# Patient Record
Sex: FEMALE | Race: WHITE | NOT HISPANIC OR LATINO
[De-identification: names, ages, dates, MRNs, and addresses within clinical notes are randomized per-mention and may not be internally consistent; named-entity substitution may affect disease eponyms.]

---

## 2017-02-28 ENCOUNTER — HOSPITAL (OUTPATIENT)
Dept: OTHER | Age: 62
End: 2017-02-28
Attending: FAMILY MEDICINE

## 2017-12-23 ENCOUNTER — HOSPITAL ENCOUNTER (OUTPATIENT)
Age: 62
Discharge: HOME | End: 2017-12-23
Attending: FAMILY MEDICINE
Payer: COMMERCIAL

## 2017-12-23 VITALS
HEART RATE: 72 BPM | BODY MASS INDEX: 24.84 KG/M2 | TEMPERATURE: 98 F | HEIGHT: 62 IN | OXYGEN SATURATION: 100 % | WEIGHT: 135 LBS | SYSTOLIC BLOOD PRESSURE: 155 MMHG | RESPIRATION RATE: 18 BRPM | DIASTOLIC BLOOD PRESSURE: 86 MMHG

## 2017-12-23 DIAGNOSIS — H61.23 BILATERAL IMPACTED CERUMEN: Primary | ICD-10-CM

## 2017-12-23 PROCEDURE — 99202 OFFICE O/P NEW SF 15 MIN: CPT

## 2017-12-23 RX ORDER — SIMVASTATIN 10 MG
10 TABLET ORAL NIGHTLY
COMMUNITY

## 2017-12-23 RX ORDER — MONTELUKAST SODIUM 10 MG/1
10 TABLET ORAL NIGHTLY
COMMUNITY

## 2017-12-23 NOTE — ED PROVIDER NOTES
Patient Seen in: [address]    History   No chief complaint on file. Stated Complaint: ear problem     HPI    Pt is a 56 yo with cerumen impacted B. Feels pressure in both ears. History reviewed.  No pertinent past Prescribed:  Current Discharge Medication List

## 2021-02-03 ENCOUNTER — IMMUNIZATION (OUTPATIENT)
Dept: LAB | Age: 66
End: 2021-02-03

## 2021-02-03 DIAGNOSIS — Z23 NEED FOR VACCINATION: Primary | ICD-10-CM

## 2021-02-03 PROCEDURE — 0001A COVID 19 PFIZER-BIONTECH: CPT

## 2021-02-03 PROCEDURE — 91300 COVID 19 PFIZER-BIONTECH: CPT

## 2021-02-24 ENCOUNTER — IMMUNIZATION (OUTPATIENT)
Dept: LAB | Age: 66
End: 2021-02-24

## 2021-02-24 DIAGNOSIS — Z23 NEED FOR VACCINATION: Primary | ICD-10-CM

## 2021-02-24 PROCEDURE — 91300 COVID 19 PFIZER-BIONTECH: CPT

## 2021-02-24 PROCEDURE — 0002A COVID 19 PFIZER-BIONTECH: CPT

## 2022-09-09 ENCOUNTER — LAB ENCOUNTER (OUTPATIENT)
Dept: LAB | Facility: HOSPITAL | Age: 67
End: 2022-09-09
Attending: DERMATOLOGY
Payer: COMMERCIAL

## 2022-09-09 DIAGNOSIS — Z13.9 SCREENING PROCEDURE: ICD-10-CM

## 2022-09-09 LAB — SARS-COV-2 RNA RESP QL NAA+PROBE: NOT DETECTED

## 2024-01-06 ENCOUNTER — APPOINTMENT (OUTPATIENT)
Dept: GENERAL RADIOLOGY | Age: 69
End: 2024-01-06
Attending: NURSE PRACTITIONER
Payer: MEDICARE

## 2024-01-06 ENCOUNTER — HOSPITAL ENCOUNTER (OUTPATIENT)
Age: 69
Discharge: HOME | End: 2024-01-06
Payer: MEDICARE

## 2024-01-06 VITALS
OXYGEN SATURATION: 98 % | TEMPERATURE: 100 F | HEART RATE: 87 BPM | RESPIRATION RATE: 18 BRPM | DIASTOLIC BLOOD PRESSURE: 81 MMHG | SYSTOLIC BLOOD PRESSURE: 151 MMHG

## 2024-01-06 DIAGNOSIS — S92.902A CLOSED FRACTURE OF LEFT FOOT, INITIAL ENCOUNTER: ICD-10-CM

## 2024-01-06 DIAGNOSIS — S62.515A CLOSED NONDISPLACED FRACTURE OF PROXIMAL PHALANX OF LEFT THUMB, INITIAL ENCOUNTER: Primary | ICD-10-CM

## 2024-01-06 DIAGNOSIS — S92.901A CLOSED FRACTURE OF RIGHT FOOT, INITIAL ENCOUNTER: ICD-10-CM

## 2024-01-06 PROCEDURE — 73630 X-RAY EXAM OF FOOT: CPT | Performed by: NURSE PRACTITIONER

## 2024-01-06 PROCEDURE — A6448 LT COMPRES BAND <3"/YD: HCPCS | Performed by: NURSE PRACTITIONER

## 2024-01-06 PROCEDURE — 99203 OFFICE O/P NEW LOW 30 MIN: CPT | Performed by: NURSE PRACTITIONER

## 2024-01-06 PROCEDURE — L3260 AMBULATORY SURGICAL BOOT EAC: HCPCS | Performed by: NURSE PRACTITIONER

## 2024-01-06 PROCEDURE — A4570 SPLINT: HCPCS | Performed by: NURSE PRACTITIONER

## 2024-01-06 PROCEDURE — 73140 X-RAY EXAM OF FINGER(S): CPT | Performed by: NURSE PRACTITIONER

## 2024-01-06 NOTE — ED PROVIDER NOTES
Patient Seen in: [facility]    History   CC: fall  HPI: Avis FISHER Felipe 68 year old female  who presents c/o bilateral foot pain and left thumb pain status post injury in which patient states she got her heel stuck on a stair yesterday and accidentally fell.  Denies hitting her head or loss of consciousness.  Denies headache, dizziness, neck pain, back pain or radicular signs/symptoms/numbness/tingling/weakness associated.     No past medical history on file.    No past surgical history on file.    No family history on file.    Social History     Socioeconomic History    Marital status:        ROS:  Review of Systems    Positive for stated complaint: foot injury  Other systems are as noted in HPI.  Constitutional and vital signs reviewed.      All other systems reviewed and negative except as noted above.    PSFH elements reviewed from today and agreed except as otherwise stated in HPI.             Constitutional and vital signs reviewed.        Physical Exam     ED Triage Vitals [01/06/24 1414]   /81   Pulse 87   Resp 18   Temp 99.7 °F (37.6 °C)   Temp src Temporal   SpO2 98 %   O2 Device None (Room air)       Current:/81   Pulse 87   Temp 99.7 °F (37.6 °C) (Temporal)   Resp 18   SpO2 98%         PE:  General - Appears well, non-toxic and in NAD  Head - Appears symmetrical without deformity/swelling cranium, scalp, or facial bones  Skin -there is diffuse swelling and ecchymosis noted to the dorsal feet bilaterally.  No open wounds.  Skin is otherwise pink warm and dry throughout, mmm, cap refill <2seconds  Neuro - A&O x4, sensation equal to both medial and lateral aspects of lower extremities, steady gait  MSK - makes purposeful movements of lower extremities, pedal pulses 2+ bilat.  no midline spinal tenderness or obvious sign of trauma/swelling/deformity, +flexion of the 1st and 5th toes bilat.  + Tenderness is elicited with palpation to the proximal foot and anterior ankle  bilaterally.  There is no lateral/medial ankle tenderness bilaterally, calcaneus, shin, calf, knee or thigh tenderness to the lower extremities bilaterally.  No toe tenderness.  Proximal left thumb tenderness on exam.  No wrist, hand including navicular tenderness on exam.  Psych - Interactive and appropriate      ED Course   Labs Reviewed - No data to display    MDM     XR FINGER(S) (MIN 2 VIEWS), LEFT THUMB (CPT=73140) (Final result)  Result time 01/06/24 14:55:40  Final result by Scott Colón MD (01/06/24 14:55:40)                Impression:    CONCLUSION:     Nondisplaced intra-articular fracture base of the proximal 1st phalanx.           Dictated by (CST): Scott Colón MD on 1/06/2024 at 2:54 PM      Finalized by (CST): Scott Colón MD on 1/06/2024 at 2:55 PM                  Narrative:    PROCEDURE: XR FINGER(S) (MIN 2 VIEWS), LEFT THUMB (CPT=73140)     COMPARISON: None.     INDICATIONS: Left thumb pain following a fall 1 day prior.     TECHNIQUE: 3 views were obtained.       FINDINGS:  BONES: There appears to be a nondisplaced intra-articular fracture involving the base of the proximal 1st phalanx.  Mild-to-moderate degenerative change noted at the 1st CMC joint with mild degenerative change in the 1st finger.  SOFT TISSUES: Negative. No visible soft tissue swelling.  EFFUSION: None visible.  OTHER: Negative.                                  XR FOOT, COMPLETE (MIN 3 VIEWS), LEFT (CPT=73630) (Edited Result - FINAL)  Result time 01/06/24 14:58:55  Addendum (preliminary) 1 of 1 by Scott Colón MD (01/06/24 14:58:55)    This report includes an Addendum and supersedes previous reports for this exam.           PROCEDURE:  XR FOOT, COMPLETE (MIN 3 VIEWS), LEFT (CPT=73630)     COMPARISON: None.     INDICATIONS:  Pain/contusion over the left tarsals/metatarsal regions following a fall 1 day prior.     TECHNIQUE:    3 views were obtained.       FINDINGS:          BONES:             Small osseous  density is seen along the distal dorsal mallet gin of the talus on the lateral view.  Mild 1st MTP degenerative change.  Mild calcaneal enthesophytes  SOFT TISSUES:            Negative. No visible soft tissue swelling.   EFFUSION:       None visible.   OTHER:             Negative.      CONCLUSION:     Small osseous density along the distal dorsal margin of the talus which could reflect an age-indeterminate avulsion fracture.                     Dictated by (CST): Scott Colón MD on 1/06/2024 at 2:55 PM       Finalized by (CST): Scott Colón MD on 1/06/2024 at 2:56 PM           ADDENDUM:  Please see the addended report which was inadvertently finalized prior to completion.              Dictated by (CST): Scott Colón MD on 1/06/2024 at 2:58 PM       Finalized by (CST): Scott Colón MD on 1/06/2024 at 2:58 PM                             Final result by Scott Colón MD (01/06/24 14:56:43)                Impression:    CONCLUSION:     No acute fracture or dislocation.           Dictated by (CST): Scott Colón MD on 1/06/2024 at 2:55 PM      Finalized by (CST): Scott Colón MD on 1/06/2024 at 2:56 PM                  Narrative:    PROCEDURE: XR FOOT, COMPLETE (MIN 3 VIEWS), LEFT (CPT=73630)     COMPARISON: None.     INDICATIONS: Pain/contusion over the left tarsals/metatarsal regions following a fall 1 day prior.     TECHNIQUE: 3 views were obtained.       FINDINGS:  BONES: No evidence of acute fracture or dislocation.  Mild 1st MTP degenerative change.  Mild calcaneal enthesophytes  SOFT TISSUES: Negative. No visible soft tissue swelling.  EFFUSION: None visible.  OTHER: Negative.                                  XR FOOT, COMPLETE (MIN 3 VIEWS), RIGHT (CPT=73630) (Final result)  Result time 01/06/24 15:00:06  Final result by Scott Colón MD (01/06/24 15:00:06)                Impression:    CONCLUSION:     Small osseous densities along the dorsal margin of the navicular and distal  talus.  Mild adjacent dorsal soft tissue swelling is noted and these may reflect acute avulsion fractures.           Dictated by (CST): Scott Colón MD on 1/06/2024 at 2:59 PM      Finalized by (CST): Scott Colón MD on 1/06/2024 at 3:00 PM                  Narrative:    PROCEDURE: XR FOOT, COMPLETE (MIN 3 VIEWS), RIGHT (CPT=73630)     COMPARISON: None.     INDICATIONS: Pain/contusion over the right tarsal/metatarsal regions following a fall 1 day prior.     TECHNIQUE: 3 views were obtained.       FINDINGS:  BONES: Small osseous density seen along the dorsal margin of the talus and navicular on the lateral view.  There is some overlying soft tissue swelling.  No osseous malalignment.  Mild-to-moderate degenerative change 1st MTP joint.  Mild calcaneal  enthesophyte formation.  SOFT TISSUES: Negative. No visible soft tissue swelling.  EFFUSION: None visible.  OTHER: Negative.                 X-ray results discussed with patient as well as avulsion fracture, thumb fracture, splinting, Ace wrap and postop shoe instructions.  This facility only had 1 postop shoe and patient's appropriate size.  States she will follow-up Monday with foot and ankle group recommended.  Splint instructions and precautions, rest, ice, elevation, Tylenol or Motrin as needed for discomfort, follow-up and return/ED precautions reviewed.  patient demonstrates understanding of instruction and agrees with plan of care.    A finger splint was applied. After application of the splint I returned and re-examined the patient. The splint was adequately immobilizing the joint and distal to the splint the patient's circulation and sensation was intact.      Disposition and Plan     Clinical Impression:  1. Closed nondisplaced fracture of proximal phalanx of left thumb, initial encounter    2. Closed fracture of left foot, initial encounter    3. Closed fracture of right foot, initial encounter         Disposition:  Discharge    Follow-up:  Luis Luna MD  [address] [facility] [address]  [phone]    Schedule an appointment as soon as possible for a visit in 2 days      Patience Burton, DPGRUPO  [address] [address]  [phone]    Schedule an appointment as soon as possible for a visit in 2 days        Medications Prescribed:  Discharge Medication List as of 1/6/2024  3:12 PM

## 2024-01-06 NOTE — ED INITIAL ASSESSMENT (HPI)
Patient states she fell while her heel got stuck on a stair yesterday and thinks she may have hyperextended her feet.

## 2024-01-06 NOTE — DISCHARGE INSTRUCTIONS
Rest from exacerbating activities for the next week. Apply ice/cold compress for 20min at a time 4-6x daily for the next 2-3 days. Keep the legs and left hand elevated when resting as much as possible. You may take Motrin every 6 hours as needed for pain. Take this with food. If additional pain control is needed, you may also take Tylenol every 6 hours. Follow up with the foot and hand specialist provided in your discharge instructions in the next 2-3 days. Seek additional care in the ER for new or worsening symptoms, fever or increasing pain.

## 2024-01-08 ENCOUNTER — OFFICE VISIT (OUTPATIENT)
Dept: SURGERY | Facility: CLINIC | Age: 69
End: 2024-01-08
Payer: MEDICARE

## 2024-01-08 ENCOUNTER — OFFICE VISIT (OUTPATIENT)
Dept: SURGERY | Facility: CLINIC | Age: 69
End: 2024-01-08

## 2024-01-08 DIAGNOSIS — S62.515A CLOSED NONDISPLACED FRACTURE OF PROXIMAL PHALANX OF LEFT THUMB, INITIAL ENCOUNTER: Primary | ICD-10-CM

## 2024-01-08 NOTE — PROGRESS NOTES
Subjective: I fell on my left hand.      Objective:     Current level of performance:  ADL: Independent  Work: N/A  Leisure: Not addressed    Measurements/Tests:  ROM:         N/A         Treatment Provided this day: Fabricated a left thumb spica splint per order.    Treatment Time: 30 minutes      Summary/Analysis of Treatment session: Tolerated the fabrication of a left thumb spica splint well.      Plan: To be compliant with the wear and care of left thumb spica splint x 2 weeks.      Follow up in:  x 2 weeks:          Mariano PATRICIO/L

## 2024-01-08 NOTE — H&P
Avis Wang is a 68 year old female that presents with   Chief Complaint   Patient presents with    Fracture     LTH   .    REFERRED BY:  Germania Lopez    Pacemaker: No  Latex Allergy: no  Coumadin: No  Plavix: No  Other anticoagulants: No  Diet medication: No  Cardiac stents: No    HAND DOMINANCE:  Right    Profession: Retired    RECONSTRUCTIVE HISTORY    SUN EXPOSURE   Current no   Past no   Sunburns yes   Tanning salons current no   Tanning salons past no    SKIN CANCER    Personal history of skin cancer: squamous cell carcinoma, melanoma basal cell carcinoma      HPI:       Injury 1: L TH PP base, intra-articular, nondisplaced  - Date: 01/05/24  - Days Since: 3      68-year-old female right-hand-dominant with a left thumb fracture    Fell on her thumb  Immediate care, x-rayed and splinted  Moderate pain            Review of Systems:   Constitutional: No change in appetite, chill/rigors, or fatigue  GI: No jaundice  Endocrine: No generalized weakness  Neurological: No aphasia, loss of consciousness, or seizures    Musculoskeletal:      Date of injury 1/5/24     Location left      thumb      Mechanism Heel got caught on a stair causing pt to fall      Treatment Seen in immediate care on 1/6/24, x-ray performed, splinted       Pain  yes 3/10 intermittent     Numbness no      PMH:     MEDICAL  No past medical history on file.     SURGICAL  History reviewed. No pertinent surgical history.     ALLERIGIES  Allergies   Allergen Reactions    Neosporin Original [Neomycin-Bacitracin-Polymyxin] RASH        MEDICATIONS  Current Outpatient Medications   Medication Sig Dispense Refill    Montelukast Sodium 10 MG Oral Tab Take 1 tablet (10 mg total) by mouth nightly.      simvastatin 10 MG Oral Tab Take 1 tablet (10 mg total) by mouth nightly.          SOCIAL HISTORY  Social History     Socioeconomic History    Marital status:    Tobacco Use    Smoking status: Never    Smokeless tobacco: Never   Vaping Use    Vaping  Use: Never used   Substance and Sexual Activity    Alcohol use: Not Currently    Drug use: Never   Other Topics Concern    Right Handed Yes        FAMILY HISTORY  No family history on file.       PHYSICAL EXAM:     CONSTITUTIONAL: Overall appearance - Normal  HEENT: Normocephalic  EYES: Conjunctiva - Right: Normal, Left: Normal; EOMI  EARS: Inspection - Right: Normal, Left: Normal  NECK/THYROID: Inspection - Normal, Palpation - Normal, Thyroid gland - Normal, No adenopathy  RESPIRATORY: Inspection - Normal, Effort - Normal  CARDIOVASCULAR: Regular rhythm, No murmurs  ABDOMEN: Inspection - Normal, No abdominal tenderness  NEURO: Memory intact  PSYCH: Oriented to person, place, time, and situation, Appropriate mood and affect      Hand Physical Exam:     Left thumb Limited range of motion  Slight MP tenderness dorsally  RCL/UCL intact  IP no tenderness, RCL/UCL intact  FPL/EPL intact    X-ray independently interpreted: Proximal phalanx base intra-articular nondisplaced    ASSESSMENT/PLAN:     Fracture: Left thumb proximal phalanx vertical intra-articular, nondisplaced    We discussed the fracture/dislocation and the treatment options.  Questions were answered and the patient wishes to proceed with treatment.     INTRA-ARTICULAR FRACTURE/DISLOCATION - We discussed the seriousness of a fracture in the joint.  Even with satisfactory treatment, there is an increased chance of stiffness, limited function, and of developing arthritis in the joint.    Thumb spica splint for 2 weeks    We discussed restrictions.    Even with satisfactory healing, the hand/digit may not regain normal ROM or normal function.      2 weeks OT, active range and tendon gliding, discontinue splint  3 weeks unrestricted activity, strengthening, resistive        1/8/2024  Luis Camarillo MD      +++++++++++++++++++++++++++++++++++++++++      MEDICAL DECISION MAKING    PROBLEMS      MODERATE    (number / complexity)          Acute complicated  injury    DATA         MODERATE    (amount / complexity)          X-rays independently reviewed    MANAGEMENT RISK  LOW    (complications/ morbidity)       Splint/OT                  MDM LEVEL    MODERATE

## 2024-01-10 ENCOUNTER — HOSPITAL ENCOUNTER (OUTPATIENT)
Age: 69
Discharge: HOME | End: 2024-01-10
Payer: MEDICARE

## 2024-01-10 VITALS
HEART RATE: 79 BPM | SYSTOLIC BLOOD PRESSURE: 173 MMHG | RESPIRATION RATE: 18 BRPM | DIASTOLIC BLOOD PRESSURE: 83 MMHG | TEMPERATURE: 98 F | OXYGEN SATURATION: 100 %

## 2024-01-10 DIAGNOSIS — B97.89 VIRAL RESPIRATORY ILLNESS: Primary | ICD-10-CM

## 2024-01-10 DIAGNOSIS — J98.8 VIRAL RESPIRATORY ILLNESS: Primary | ICD-10-CM

## 2024-01-10 LAB
S PYO AG THROAT QL: NEGATIVE
SARS-COV-2 RNA RESP QL NAA+PROBE: NOT DETECTED

## 2024-01-10 PROCEDURE — U0002 COVID-19 LAB TEST NON-CDC: HCPCS | Performed by: NURSE PRACTITIONER

## 2024-01-10 PROCEDURE — 99213 OFFICE O/P EST LOW 20 MIN: CPT | Performed by: NURSE PRACTITIONER

## 2024-01-10 PROCEDURE — 87880 STREP A ASSAY W/OPTIC: CPT | Performed by: NURSE PRACTITIONER

## 2024-01-10 NOTE — ED PROVIDER NOTES
Patient Seen in: [facility]    History   CC: sore throat  HPI: Avis NATALIA Felipe 68 year old female  who presents c/o sore throat and mild cough beginning 1/7/2024.  Denies runny nose, congestion, fever, rash, difficulty swallowing, difficulty breathing, drooling, GI signs/symptoms.    History reviewed. No pertinent past medical history.    History reviewed. No pertinent surgical history.    No family history on file.    Social History     Socioeconomic History    Marital status:    Tobacco Use    Smoking status: Never    Smokeless tobacco: Never   Vaping Use    Vaping Use: Never used   Substance and Sexual Activity    Alcohol use: Not Currently    Drug use: Never   Other Topics Concern    Right Handed Yes       ROS:  Review of Systems    Positive for stated complaint: Sore Throat  Other systems are as noted in HPI.  Constitutional and vital signs reviewed.      All other systems reviewed and negative except as noted above.    PSFH elements reviewed from today and agreed except as otherwise stated in HPI.             Constitutional and vital signs reviewed.        Physical Exam     ED Triage Vitals [01/10/24 1126]   BP (!) 173/83   Pulse 79   Resp 18   Temp 97.9 °F (36.6 °C)   Temp src Temporal   SpO2 100 %   O2 Device None (Room air)       Current:BP (!) 173/83   Pulse 79   Temp 97.9 °F (36.6 °C) (Temporal)   Resp 18   SpO2 100%         PE:  General - Appears well, non-toxic and in NAD  Head - Appears symmetrical without deformity/swelling cranium, scalp, or facial bones  Eyes - sclera not injected, no discharge noted, no periorbital edema  ENT - EAC bilaterally without discharge, TM pearly grey with COL visualized appropriately bilaterally.   no nasal drainage noted in nares bilat, no cobblestoning to post. Pharynx.   Oropharynx clear, posterior pharynx is subtly erythematous without tonsilar enlargement or exudate, uvula midline, +gag, voice is clear. No trismus  Neck - no significant  adenopathy, supple with trachea midline  Resp - Lung sounds clear bilaterally and wob unlabored, good aeration with equal, even expansion bilaterally   CV - RRR  Skin - no rashes or petechiae noted, pink warm and dry throughout, mmm, no obvious signs of swelling/trauma/deformity, cap refill <2seconds  Neuro - A&O x4, steady gait  MSK - makes purposeful movements of all extremities, radial pulses 2+ bilat.  Psych - Interactive and appropriate      ED Course     Labs Reviewed   POCT RAPID STREP - Normal   RAPID SARS-COV-2 BY PCR - Normal       MDM     Rapid strep negative. Rapid covid negative. Generalized viral illness counseling performed. Reviewed over-the-counter nonpharmacologic treatment modalities such as Tylenol or Motrin as needed for discomfort, throat lozenges, salt water gargles, hydration instructions, humidifier etc. as well as f/u and ED/return precautions. Pt demonstrates understanding of information and agrees w/ poc.       Disposition and Plan     Clinical Impression:  1. Viral respiratory illness        Disposition:  Discharge    Follow-up:  Michael Martinez  [address] [facility] [address]  [phone]    Schedule an appointment as soon as possible for a visit in 2 days        Medications Prescribed:  Current Discharge Medication List

## 2024-01-22 ENCOUNTER — OFFICE VISIT (OUTPATIENT)
Dept: SURGERY | Facility: CLINIC | Age: 69
End: 2024-01-22
Payer: MEDICARE

## 2024-01-22 DIAGNOSIS — M62.81 DISTAL MUSCLE WEAKNESS: Primary | ICD-10-CM

## 2024-01-22 PROCEDURE — 97166 OT EVAL MOD COMPLEX 45 MIN: CPT | Performed by: OCCUPATIONAL THERAPIST

## 2024-01-22 NOTE — PROGRESS NOTES
OCCUPATIONAL THERAPY EVALUATION:   Avis Wang   [id number]       SUBJECTIVE:    HX of Injury: Left Thumb PP intra articular non-displaced fracture:  Chief Complaint:  No complaints.    Precautions: None  Premorbid Functional Status: Independent w/ ADL's  Current Level of Function: As noted above.  Employment: Retired  Hand Dominance: right  Living Situation: w/ spouse  Barriers to Learning: None  Patient Goals: Full use of the left hand.    Imaging/Tests: X-ray..        OBJECTIVE DATA:   PAIN:   Rating (1/10): 1/10 at rest, 2/10 with activity  Location:       ORTHOTICS:    Discontinued the use of a Left Thumb spica splint, worn for x 2 weeks:    SCAR/INCISION:  Not applicable:    SENSORY:  Intact      AROM/PROM:  (Degrees)  LEFT HAND:    Thumb IF MF RF SF   MP 50       PIP 40       DIP        ABDUL 90 degrees of ABDUL                 STRENGTH: (lbs) Right Average Left Average   : NT NT   2 pt Pinch:     3 pt Pinch:     Lateral Pinch:         ASSESSMENT & PLAN OF CARE:    Treatment Provided: Patient was seen for an initial evaluation, hand washing:  HEP:  AROM, Tendon glides, x 20 reps per set, x 5 sets daily. Reviewed hand elevation importance. Written handout was provided to reinforce today's treatment and educational session.       Rehabilitation Potential: Good    CLINICAL ASSESSMENT:    Patient/Caregiver Education Provided: Yes    Treatment Plan:  Therapeutic Exercise  Therapeutic Activities  Modalities  Patient/Family Education    GOALS:  Short term goals to be reached in x 1 week:    1) Independent with HEP..    Long term goals to be reached in x 2 - 3 weeks:    1) Full functional use of the involved extremity for self-care, leisure and work related tasks:.        Patient will be seen 1 x /week for 3 weeks or a total of 3 visits.   Pt. was advised regarding the findings of this evaluation and agrees to the plan of care.     ROMMEL Villatoro     I have reviewed the treatment plan and concur.   Luis NAPIER  MD [place]